# Patient Record
Sex: MALE | Race: BLACK OR AFRICAN AMERICAN | NOT HISPANIC OR LATINO | Employment: UNEMPLOYED
[De-identification: names, ages, dates, MRNs, and addresses within clinical notes are randomized per-mention and may not be internally consistent; named-entity substitution may affect disease eponyms.]

---
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
## 2023-02-09 ENCOUNTER — HOSPITAL ENCOUNTER (EMERGENCY)
Facility: CLINIC | Age: 23
Discharge: HOME | End: 2023-02-09
Attending: EMERGENCY MEDICINE | Admitting: EMERGENCY MEDICINE
Payer: COMMERCIAL

## 2023-02-09 ENCOUNTER — APPOINTMENT (OUTPATIENT)
Dept: GENERAL RADIOLOGY | Facility: CLINIC | Age: 23
End: 2023-02-09
Attending: EMERGENCY MEDICINE
Payer: COMMERCIAL

## 2023-02-09 VITALS
TEMPERATURE: 98.1 F | OXYGEN SATURATION: 100 % | DIASTOLIC BLOOD PRESSURE: 43 MMHG | RESPIRATION RATE: 18 BRPM | SYSTOLIC BLOOD PRESSURE: 96 MMHG | HEART RATE: 69 BPM

## 2023-02-09 DIAGNOSIS — S89.91XA KNEE INJURY, RIGHT, INITIAL ENCOUNTER: ICD-10-CM

## 2023-02-09 PROCEDURE — 73562 X-RAY EXAM OF KNEE 3: CPT | Mod: RT

## 2023-02-09 PROCEDURE — 99284 EMERGENCY DEPT VISIT MOD MDM: CPT | Mod: 25

## 2023-02-09 PROCEDURE — 29505 APPLICATION LONG LEG SPLINT: CPT | Mod: RT

## 2023-02-10 NOTE — ED TRIAGE NOTES
Patient states he hurt his right knee tonight while playing basketball. Patient is able to walk on it and took 1000 mg of Tylenol prior to arrival.      Triage Assessment     Row Name 02/09/23 6150       Triage Assessment (Adult)    Airway WDL WDL       Respiratory WDL    Respiratory WDL WDL       Skin Circulation/Temperature WDL    Skin Circulation/Temperature WDL WDL       Cardiac WDL    Cardiac WDL WDL       Peripheral/Neurovascular WDL    Peripheral Neurovascular WDL WDL       Cognitive/Neuro/Behavioral WDL    Cognitive/Neuro/Behavioral WDL WDL

## 2023-02-10 NOTE — ED PROVIDER NOTES
History     Chief Complaint:  Knee Injury       HPI   Kate Malik is a 22 year old male who presents for evaluation of right knee injury.  The patient states that he was playing both basketball and soccer this evening for an excessive amount of time.  He began by playing soccer, then transition to basketball.  1 point soccer, he fell, but did not believe he injured his right knee.  However, after finishing a basketball, he noticed soreness and swelling to his right knee, and pain with ambulation up stairs.  There after trip Tylenol and had significant improvement in pain.  He denies pain to the hip, thigh, lower leg, or ankle.  He denies any previous injuries to the knee.        Review of External Notes: Reviewed January 19, 2021 family medicine visit    ROS:  Review of Systems    Allergies:  No Known Allergies     Medications:    AMOXICILLIN 400 MG/5ML OR SUSR  PREVACID OR        Past Medical History:    Past Medical History:   Diagnosis Date     PERS HX ALLERGY TO EGGS        Past Surgical History:    No past surgical history on file.     Family History:    family history is not on file.    Social History:   reports that he has never smoked. He does not have any smokeless tobacco history on file.  PCP: No primary care provider on file.     Physical Exam     Patient Vitals for the past 24 hrs:   BP Temp Temp src Pulse Resp SpO2   02/09/23 2231 96/43 98.1  F (36.7  C) Oral 69 18 100 %        Physical Exam  Constitutional: Alert, attentive  CV: 2+ DP and PT pulses, brisk distal cap refill  MSK: Mild swelling to the right knee with vague bilateral joint line tenderness.  No asymmetric erythema or warmth.  No pain out of portion to exam. Normal extension and flexion function to the knee  Neurological: 5/5 strength to the DF, PF, EHL and FHL motor functions; sensation intact to the DP, SP, T, S and S distributions  Skin: Skin is warm and dry.      Emergency Department Course       Imaging:  XR Knee Right 3  Views   Final Result   IMPRESSION: Normal joint spaces and alignment. No fracture or joint effusion.        Report per radiology    Emergency Department Course & Assessments:             Independent Interpretation (X-rays, CTs, rhythm strip):  No fracture or dislocation to the knee      Disposition:  The patient was discharged to home.     Impression & Plan      Medical Decision Making:  This is a very pleasant 22 year old male who presented with a right knee injury consistent with sprain. This occurred while playing sports, and is associated with pain to the right knee. Quadriceps rupture is not evident; there is no palpable defect and and extension function is intact. There is no evidence of fracture on x-ray. We will plan for knee immobilizer and orthopedic follow-up in 2-3 days. I discussed the possibility of ligamentous injury, and that the patient may need MRI if symptoms do not improve. I advised ibuprofen, ice, rest and elevation. The patient is ambulatory with the knee immobilizer. I advised strict return precautions for worse pain, swelling, numbness, or any other concerning symptoms, as well as follow-up with orthopedic as per above.       Diagnosis:    ICD-10-CM    1. Knee injury, right, initial encounter  S89.91XA Knee Supplies Order Knee Immobilizer; Right           Discharge Medications:  New Prescriptions    No medications on file               Mark Berman MD  02/09/23 5868